# Patient Record
Sex: MALE | Race: WHITE | NOT HISPANIC OR LATINO | Employment: FULL TIME | URBAN - METROPOLITAN AREA
[De-identification: names, ages, dates, MRNs, and addresses within clinical notes are randomized per-mention and may not be internally consistent; named-entity substitution may affect disease eponyms.]

---

## 2017-03-14 ENCOUNTER — OFFICE VISIT (OUTPATIENT)
Dept: AUDIOLOGY | Facility: CLINIC | Age: 57
End: 2017-03-14

## 2017-03-14 DIAGNOSIS — H90.72 MIXED HEARING LOSS OF LEFT EAR: ICD-10-CM

## 2017-03-14 DIAGNOSIS — H91.90 HEARING LOSS: Primary | ICD-10-CM

## 2017-03-14 DIAGNOSIS — H90.5 SENSORINEURAL HEARING LOSS OF RIGHT EAR: ICD-10-CM

## 2017-03-14 NOTE — MR AVS SNAPSHOT
After Visit Summary   3/14/2017    Pedro Pablo Hill    MRN: [id number]           Patient Information     Date Of Birth          1960        Visit Information        Provider Department      3/14/2017 4:00 PM Janett Gorman [facility]        Today's Diagnoses     Sensorineural hearing loss of right ear        Mixed hearing loss of left ear           Follow-ups after your visit        Your next 10 appointments already scheduled     Mar 15, 2017  7:00 AM CDT   (Arrive by 6:45 AM)   Return Visit with Desi Watkins MD   [facility] ([facility] [place])    [address]  [address] [facility] [address] [phone]              Who to contact     Please call your clinic at [phone] to:    Ask questions about your health    Make or cancel appointments    Discuss your medicines    Learn about your test results    Speak to your doctor   If you have compliments or concerns about an experience at your clinic, or if you wish to file a complaint, please contact [facility] Patient Relations at [phone] or email us at [email or web address]         Additional Information About Your Visit        MyChart Information     Boltt gives you secure access to your electronic health record. If you see a primary care provider, you can also send messages to your care team and make appointments. If you have questions, please call your primary care clinic.  If you do not have a primary care provider, please call [phone] and they will assist you.      Mobile Roadie is an electronic gateway that provides easy, online access to your medical records. With Mobile Roadie, you can request a clinic appointment, read your test results, renew a prescription or communicate with your care team.     To access your existing account, please contact your [facility] or call [phone] for assistance.         Care EveryWhere ID     This is your Care EveryWhere ID. This could be used by other organizations to access your [place] medical records  [id number]         Blood Pressure from Last 3 Encounters:   11/19/12 137/96   10/10/12 120/78   07/24/12 118/70    Weight from Last 3 Encounters:   11/26/12 81.6 kg (180 lb)   11/19/12 81.6 kg (180 lb)   10/10/12 82.6 kg (182 lb)              We Performed the Following     AUDIOGRAM/TYMPANOGRAM - INTERFACE     AUDIOLOGY ADULT REFERRAL     Cmpn Audiometry Thrshld Eval & Speech Recog (93534)     Reduced 52 - Tymps / Reflex   (27940)        Primary Care Provider Office Phone # Fax #    Masha Main PA-C [phone]       [facility] [address] [place] [address]        Thank you!     Thank you for choosing [facility] AUDIOLOGY  for your care. Our goal is always to provide you with excellent care. Hearing back from our patients is one way we can continue to improve our services. Please take a few minutes to complete the written survey that you may receive in the mail after your visit with us. Thank you!             Your Updated Medication List - Protect others around you: Learn how to safely use, store and throw away your medicines at www.disposemymeds.org.          This list is accurate as of: 3/14/17  5:18 PM.  Always use your most recent med list.                   Brand Name Dispense Instructions for use    aspirin 325 MG tablet      Take 325 mg by mouth daily.       B-D INSULIN SYRINGE U/F SHORT      use as directed       * B-D U/F 31G X 8 MM   Generic drug:  insulin pen needle      use 7 per day       * insulin pen needle 31G X 8 MM     630 each    Use 7 daiily       cholecalciferol 5000 UNITS Caps      Take 1 capsule by mouth 2 times daily.       fish oil-omega-3 fatty acids 1000 MG capsule      Take 2 capsules by mouth 2 times daily.       insulin glargine 100 UNIT/ML injection    LANTUS SOLOSTAR    60 mL    Inject  Subcutaneous. 56  units SQ at bedtime.       liraglutide 18 MG/3ML Soln    VICTOZA    9 mL    Inject   1.2 mg SQ once a day.       lisinopril 5 MG tablet    PRINIVIL/ZESTRIL    30 tablet    Take 1 tablet by mouth daily.       metFORMIN 500 MG 24 hr tablet    GLUCOPHAGE-XR    120 tablet    Take 2 tablets by mouth twice daily.       NovoLOG FLEXpen 100 UNITS/ML injection   Generic drug:  insulin aspart     3 Month    Inject  Subcutaneous. 2 units/15 grams of carbohydrate with meals and snacks, plus correction scale 1 unit/25 mg/dl glucose over 125.       omeprazole 20 MG CR capsule    priLOSEC    60 capsule    Take 1 capsule by mouth 2 times daily.       pregabalin 50 MG capsule    LYRICA    90 capsule    Take 1 tablet three times daily  For pain       * Notice:  This list has 2 medication(s) that are the same as other medications prescribed for you. Read the directions carefully, and ask your doctor or other care provider to review them with you.

## 2017-03-14 NOTE — PROGRESS NOTES
AUDIOLOGY REPORT    SUMMARY: Audiology visit completed. See audiogram for results.      RECOMMENDATIONS: Follow-up with ENT.    Brendan Goncalves.  Licensed Audiologist  [place] #[id number]

## 2017-03-15 ENCOUNTER — OFFICE VISIT (OUTPATIENT)
Dept: OTOLARYNGOLOGY | Facility: CLINIC | Age: 57
End: 2017-03-15

## 2017-03-15 VITALS — WEIGHT: 186 LBS | BODY MASS INDEX: 29.19 KG/M2 | HEIGHT: 67 IN

## 2017-03-15 DIAGNOSIS — H90.3 ASYMMETRICAL SENSORINEURAL HEARING LOSS: Primary | ICD-10-CM

## 2017-03-15 ASSESSMENT — PAIN SCALES - GENERAL: PAINLEVEL: NO PAIN (0)

## 2017-03-15 NOTE — LETTER
3/15/2017      RE: Pedro Pablo Hill  [address] [address]       Pedro Pablo Hill is seen for concerns of right hearing loss.  He had a left sudden sensorineural hearing loss several years ago which was treated with oral and intratympanic steroids.  He continues to have a loss for which he wears hearing aids and feels they work well.  He began having right sided fullness and crackling and hearing loss recently.  He was seen yesterday for an audiogram and was noted to have a cerumen impaction on the right which was cleaned.  He says his ear feels much better now.    Review of systems:  No cough but he has had intermittent nasal congestion for several months.    Physical examination:  male in no acute distress.  Alert and answering questions appropriately.  HB 1/6 bilaterally.  Both ears examined under the microscope.  Right ear canal clear, TM intact with a well aerated middle ear.  Left era canal clear, TM intact with a well aerated middle ear, TM thin in the posterior inferior quadrant likely from his steroid injections.    Audiogram:  Right normal downsloping to a mild sensorineural hearing loss in the highest frequencies, 96% speech discrimination.  Left normal/mild downsloping to moderate upsloping to normal/mild downsloping again to moderate primarily sensorineural hearing loss with 92% speech discrimination.  Hearing stable bilaterally.  Normal tympanograms.  Right ipsilateral reflex present only.    Assessment and plan:  Stable hearing bilaterally.  His symptoms likely were secondary to the cerumen impaction.      Desi Watkins MD

## 2017-03-15 NOTE — PROGRESS NOTES
Pedro Pablo Hill is seen for concerns of right hearing loss.  He had a left sudden sensorineural hearing loss several years ago which was treated with oral and intratympanic steroids.  He continues to have a loss for which he wears hearing aids and feels they work well.  He began having right sided fullness and crackling and hearing loss recently.  He was seen yesterday for an audiogram and was noted to have a cerumen impaction on the right which was cleaned.  He says his ear feels much better now.    Review of systems:  No cough but he has had intermittent nasal congestion for several months.    Physical examination:  male in no acute distress.  Alert and answering questions appropriately.  HB 1/6 bilaterally.  Both ears examined under the microscope.  Right ear canal clear, TM intact with a well aerated middle ear.  Left era canal clear, TM intact with a well aerated middle ear, TM thin in the posterior inferior quadrant likely from his steroid injections.    Audiogram:  Right normal downsloping to a mild sensorineural hearing loss in the highest frequencies, 96% speech discrimination.  Left normal/mild downsloping to moderate upsloping to normal/mild downsloping again to moderate primarily sensorineural hearing loss with 92% speech discrimination.  Hearing stable bilaterally.  Normal tympanograms.  Right ipsilateral reflex present only.    Assessment and plan:  Stable hearing bilaterally.  His symptoms likely were secondary to the cerumen impaction.

## 2017-03-15 NOTE — MR AVS SNAPSHOT
After Visit Summary   3/15/2017    Pedro Pablo Hill    MRN: [id number]           Patient Information     Date Of Birth          1960        Visit Information        Provider Department      3/15/2017 7:00 AM Desi Watkins MD [facility]        Today's Diagnoses     Asymmetrical sensorineural hearing loss    -  1      Care Instructions       Please call our clinic for any questions,concerns,or worsening symptoms.      Clinic #[phone]       Option 3  for the triage nurse.        Follow-ups after your visit        Follow-up notes from your care team     Return if symptoms worsen or fail to improve.      Who to contact     Please call your clinic at [phone] to:    Ask questions about your health    Make or cancel appointments    Discuss your medicines    Learn about your test results    Speak to your doctor   If you have compliments or concerns about an experience at your clinic, or if you wish to file a complaint, please contact [facility] Patient Relations at [phone] or email us at [email or web address]         Additional Information About Your Visit        MyChart Information     GoRest Software gives you secure access to your electronic health record. If you see a primary care provider, you can also send messages to your care team and make appointments. If you have questions, please call your primary care clinic.  If you do not have a primary care provider, please call [phone] and they will assist you.      GoRest Software is an electronic gateway that provides easy, online access to your medical records. With [employer] Software, you can request a clinic appointment, read your test results, renew a prescription or communicate with your care team.     To access your existing account, please contact your [facility] or call [phone] for assistance.        Care EveryWhere ID     This is your Care EveryWhere ID. This  "could be used by other organizations to access your [place] medical records  [id number]        Your Vitals Were     Height BMI (Body Mass Index)                1.702 m (5' 7\") 29.13 kg/m2           Blood Pressure from Last 3 Encounters:   11/19/12 137/96   10/10/12 120/78   07/24/12 118/70    Weight from Last 3 Encounters:   03/15/17 84.4 kg (186 lb)   11/26/12 81.6 kg (180 lb)   11/19/12 81.6 kg (180 lb)              We Performed the Following     BINOCULAR MICROSCOPY        Primary Care Provider Office Phone # Fax #    Masha Main PA-C [phone]       [facility] [address] [place] [address]        Thank you!     Thank you for choosing [facility]  for your care. Our goal is always to provide you with excellent care. Hearing back from our patients is one way we can continue to improve our services. Please take a few minutes to complete the written survey that you may receive in the mail after your visit with us. Thank you!             Your Updated Medication List - Protect others around you: Learn how to safely use, store and throw away your medicines at www.disposemymeds.org.          This list is accurate as of: 3/15/17  7:48 AM.  Always use your most recent med list.                   Brand Name Dispense Instructions for use    aspirin 325 MG tablet      Take 325 mg by mouth daily Reported on 3/15/2017       B-D INSULIN SYRINGE U/F SHORT      use as directed       * B-D U/F 31G X 8 MM   Generic drug:  insulin pen needle      use 7 per day       * insulin pen needle 31G X 8 MM     630 each    Use 7 daiily       cholecalciferol 5000 UNITS Caps      Take 1 capsule by mouth 2 times daily Reported on 3/15/2017       fish oil-omega-3 fatty acids 1000 MG capsule      Take 2 capsules by mouth 2 times daily.       insulin glargine 100 UNIT/ML injection    LANTUS SOLOSTAR    60 mL    Inject  Subcutaneous. 56 units SQ at bedtime.       liraglutide 18 MG/3ML " Soln    VICTOZA    9 mL    Inject   1.2 mg SQ once a day.       lisinopril 5 MG tablet    PRINIVIL/ZESTRIL    30 tablet    Take 1 tablet by mouth daily.       metFORMIN 500 MG 24 hr tablet    GLUCOPHAGE-XR    120 tablet    Take 2 tablets by mouth twice daily.       NovoLOG FLEXpen 100 UNITS/ML injection   Generic drug:  insulin aspart     3 Month    Inject  Subcutaneous. 2 units/15 grams of carbohydrate with meals and snacks, plus correction scale 1 unit/25 mg/dl glucose over 125.       omeprazole 20 MG CR capsule    priLOSEC    60 capsule    Take 1 capsule by mouth 2 times daily.       pregabalin 50 MG capsule    LYRICA    90 capsule    Take 1 tablet three times daily  For pain       * Notice:  This list has 2 medication(s) that are the same as other medications prescribed for you. Read the directions carefully, and ask your doctor or other care provider to review them with you.

## 2017-03-15 NOTE — PATIENT INSTRUCTIONS
Please call our clinic for any questions,concerns,or worsening symptoms.      Clinic #[phone]       Option 3  for the triage nurse.

## 2017-03-15 NOTE — NURSING NOTE
Chief Complaint   Patient presents with     RECHECK     2 year f/u with audio     Gisella Gar Medical Assistant

## 2017-12-26 ENCOUNTER — OFFICE VISIT (OUTPATIENT)
Dept: AUDIOLOGY | Facility: CLINIC | Age: 57
End: 2017-12-26
Payer: COMMERCIAL

## 2017-12-26 DIAGNOSIS — H90.42 SENSORINEURAL HEARING LOSS (SNHL) OF LEFT EAR WITH UNRESTRICTED HEARING OF RIGHT EAR: Primary | ICD-10-CM

## 2017-12-26 NOTE — MR AVS SNAPSHOT
After Visit Summary   12/26/2017    Pedro Pablo Hill    MRN: [id number]           Patient Information     Date Of Birth          1960        Visit Information        Provider Department      12/26/2017 8:30 AM Anu Ledesma [facility]D [facility] Audiology        Today's Diagnoses     Sensorineural hearing loss (SNHL) of left ear with unrestricted hearing of right ear    -  1       Follow-ups after your visit        Who to contact     Please call your clinic at [phone] to:    Ask questions about your health    Make or cancel appointments    Discuss your medicines    Learn about your test results    Speak to your doctor   If you have compliments or concerns about an experience at your clinic, or if you wish to file a complaint, please contact [facility] Patient Relations at [phone] or email us at [email or web address]         Additional Information About Your Visit        MyChart Information     Drawn to [employer] gives you secure access to your electronic health record. If you see a primary care provider, you can also send messages to your care team and make appointments. If you have questions, please call your primary care clinic.  If you do not have a primary care provider, please call [phone] and they will assist you.      Oxtex is an electronic gateway that provides easy, online access to your medical records. With Oxtex, you can request a clinic appointment, read your test results, renew a prescription or communicate with your care team.     To access your existing account, please contact your [facility] or call [phone] for assistance.        Care EveryWhere ID     This is your Care EveryWhere ID. This could be used by other organizations to access your [place] medical records  [id number]         Blood Pressure from Last 3 Encounters:   11/19/12 137/96   10/10/12 120/78   07/24/12 118/70    Weight from Last 3  Encounters:   03/15/17 84.4 kg (186 lb)   11/26/12 81.6 kg (180 lb)   11/19/12 81.6 kg (180 lb)              We Performed the Following     No Charge, Hearing Aid Clinic Visit        Primary Care Provider Office Phone # Fax #    Masha Main PA-C [phone]       [facility] [address] [place] [address]        Equal Access to Services     CARLOS ALBERTO SERRANO : Hadii aad ku hadasho Soomaali, waaxda luqadaha, qaybta kaalmada adeegyada, waxay idiin hayaan adeeg kharash la'aan ah. So [facility] [phone].    ATENCIÓN: Si habla español, tiene a pastor disposición servicios gratuitos de asistencia lingüística. [facility] [phone].    We comply with applicable federal civil rights laws and [place] laws. We do not discriminate on the basis of race, color, national origin, age, disability, sex, sexual orientation, or gender identity.            Thank you!     Thank you for choosing [facility] AUDIOLOGY  for your care. Our goal is always to provide you with excellent care. Hearing back from our patients is one way we can continue to improve our services. Please take a few minutes to complete the written survey that you may receive in the mail after your visit with us. Thank you!             Your Updated Medication List - Protect others around you: Learn how to safely use, store and throw away your medicines at www.disposemymeds.org.          This list is accurate as of: 12/26/17 10:29 AM.  Always use your most recent med list.                   Brand Name Dispense Instructions for use Diagnosis    aspirin 325 MG tablet      Take 325 mg by mouth daily Reported on 3/15/2017    Diabetes mellitus, type 2 (H)       B-D INSULIN SYRINGE U/F SHORT      use as directed        * B-D U/F 31G X 8 MM   Generic drug:  insulin pen needle      use 7 per day        * insulin pen needle 31G X 8 MM     630 each    Use 7 daiily    Diabetes mellitus (H)       cholecalciferol 5000 UNITS Caps      Take 1 capsule by  mouth 2 times daily Reported on 3/15/2017        fish oil-omega-3 fatty acids 1000 MG capsule      Take 2 capsules by mouth 2 times daily.    Diabetes mellitus, type 2 (H)       insulin glargine 100 UNIT/ML injection    LANTUS SOLOSTAR    60 mL    Inject  Subcutaneous. 56 units SQ at bedtime.    Diabetes mellitus (H)       liraglutide 18 MG/3ML Soln    VICTOZA    9 mL    Inject   1.2 mg SQ once a day.    Diabetes mellitus, type 2 (H)       lisinopril 5 MG tablet    PRINIVIL/ZESTRIL    30 tablet    Take 1 tablet by mouth daily.    Diabetes mellitus, type 2 (H)       metFORMIN 500 MG 24 hr tablet    GLUCOPHAGE-XR    120 tablet    Take 2 tablets by mouth twice daily.    Diabetes mellitus (H)       NovoLOG FLEXpen 100 UNITS/ML injection   Generic drug:  insulin aspart     3 Month    Inject  Subcutaneous. 2 units/15 grams of carbohydrate with meals and snacks, plus correction scale 1 unit/25 mg/dl glucose over 125.    Diabetes mellitus, type 2 (H)       omeprazole 20 MG CR capsule    priLOSEC    60 capsule    Take 1 capsule by mouth 2 times daily.    Diabetes mellitus (H)       pregabalin 50 MG capsule    LYRICA    90 capsule    Take 1 tablet three times daily  For pain    Type II or unspecified type diabetes mellitus with renal manifestations, not stated as uncontrolled(250.40) (H)       * Notice:  This list has 2 medication(s) that are the same as other medications prescribed for you. Read the directions carefully, and ask your doctor or other care provider to review them with you.

## 2017-12-26 NOTE — PROGRESS NOTES
AUDIOLOGY REPORT    BACKGROUND INFORMATION: Pedro Pablo Hill was scheduled in Audiology at the [facility] on 12/26/2017 for a hearing evaluation and hearing aid adjustments.  However, when the patient arrived he did confirm that the hearing aids were working very well prior to sending for repair and now the settings are incorrect with the buttons not changing volume/programs. The patient confirms that his hearing has not changed since last testing completed in March of 2017 (documented normal hearing through 4000 Hz sloping to mild sensorineural hearing 1940-4583 Hz for the right ear and borderline normal to moderately severe primarily sensorineural hearing loss for the left ear) and so he would like to cancel the hearing evaluation today and have the hearing aids re-programmed to their previous settings from prior to repair.    TEST RESULTS AND PROCEDURES: A listening check confirmed that the buttons are not functioning to change volume and programs between the two devices. The hearing aids were connected to the computer and it was attempted to load settings from the last programming 5/18/17 but the software is not allowing this. After trouble-shooting, it was noted that the  on the left device is not correct after repair. The  was changed back to the original size/strength (1M) and the settings from 5/18/17 are now successfully loaded in the hearing aids.     The patient reports that he feels to occluded with the current non-custom hearing aid dome for the left ear. The patient was counseled that the fit of the left should be more occluded than the right due to the presence of more severe hearing loss on the left side. An 8 mm closed dome was tried on the left aid and the patient prefers the fit with no feedback present in office and the patient reporting good volume, sound quality and symmetry between ears.     SUMMARY AND RECOMMENDATIONS: A  hearing aid check was performed today.  Adjustments were made as noted above and the patient will return as needed or at least every 4-6 months for cleaning and assessment of hearing aid.  Please call this clinic with questions regarding today s visit.    Brendan Carranza.  Licensed Audiologist  [place] #[id number]

## 2018-11-05 ENCOUNTER — OFFICE VISIT (OUTPATIENT)
Dept: AUDIOLOGY | Facility: CLINIC | Age: 58
End: 2018-11-05
Payer: COMMERCIAL

## 2018-11-05 DIAGNOSIS — H90.A21 SENSORINEURAL HEARING LOSS (SNHL) OF RIGHT EAR WITH RESTRICTED HEARING OF LEFT EAR: ICD-10-CM

## 2018-11-05 DIAGNOSIS — H93.13 TINNITUS OF BOTH EARS: ICD-10-CM

## 2018-11-05 DIAGNOSIS — H90.A32 MIXED CONDUCTIVE AND SENSORINEURAL HEARING LOSS OF LEFT EAR WITH RESTRICTED HEARING OF RIGHT EAR: Primary | ICD-10-CM

## 2018-11-05 NOTE — MR AVS SNAPSHOT
After Visit Summary   11/5/2018    Pedro Pablo Hill    MRN: [id number]           Patient Information     Date Of Birth          1960        Visit Information        Provider Department      11/5/2018 8:00 AM Radha Zurita AuD M [facility] Audiology        Today's Diagnoses     Mixed conductive and sensorineural hearing loss of left ear with restricted hearing of right ear    -  1    Sensorineural hearing loss (SNHL) of right ear with restricted hearing of left ear        Tinnitus of both ears           Follow-ups after your visit        Your next 10 appointments already scheduled     Nov 26, 2018  1:00 PM CST   Hearing Aid Fitting with Sada Villar [facility] Audiology ([facility])    [address]  [address] [facility] [place] [address] [phone]              Who to contact     Please call your clinic at [phone] to:    Ask questions about your health    Make or cancel appointments    Discuss your medicines    Learn about your test results    Speak to your doctor            Additional Information About Your Visit        MyChart Information     [employer] gives you secure access to your electronic health record. If you see a primary care provider, you can also send messages to your care team and make appointments. If you have questions, please call your primary care clinic.  If you do not have a primary care provider, please call [phone] and they will assist you.      [employer] is an electronic gateway that provides easy, online access to your medical records. With [employer], you can request a clinic appointment, read your test results, renew a prescription or communicate with your care team.     To access your existing account, please contact your [facility] or call [phone] for assistance.        Care EveryWhere ID     This is your Care EveryWhere ID. This could be used by other organizations to access  your [place] medical records  [id number]         Blood Pressure from Last 3 Encounters:   11/19/12 137/96   10/10/12 120/78   07/24/12 118/70    Weight from Last 3 Encounters:   03/15/17 84.4 kg (186 lb)   11/26/12 81.6 kg (180 lb)   11/19/12 81.6 kg (180 lb)              We Performed the Following     AUDIOGRAM/TYMPANOGRAM - INTERFACE     [facility] Audiometry Thrshld Eval & Speech Recog (34091)     Hearing Aid Exam, Binaural (43879)     Tympanometry (impedance - testing) (03165)        Primary Care Provider Office Phone # Fax #    Masha Main PA-C [phone]       [facility] [address] [place] [address]        Equal Access to Services     [facility] : Hadii aad ku hadasho Soomaali, waaxda luqadaha, qaybta kaalmada adeegyada, waxay idiin hayaan william kharavictor manuel ortiz . So [facility] [phone].    ATENCIÓN: Si habla español, tiene a pastor disposición servicios gratuitos de asistencia lingüística. Llame al [phone].    We comply with applicable federal civil rights laws and [place] laws. We do not discriminate on the basis of race, color, national origin, age, disability, sex, sexual orientation, or gender identity.            Thank you!     Thank you for choosing [facility] AUDIOLOGY  for your care. Our goal is always to provide you with excellent care. Hearing back from our patients is one way we can continue to improve our services. Please take a few minutes to complete the written survey that you may receive in the mail after your visit with us. Thank you!             Your Updated Medication List - Protect others around you: Learn how to safely use, store and throw away your medicines at www.disposemymeds.org.          This list is accurate as of 11/5/18  9:54 AM.  Always use your most recent med list.                   Brand Name Dispense Instructions for use Diagnosis    aspirin 325 MG tablet      Take 325 mg by mouth daily Reported on 3/15/2017    Diabetes mellitus,  type 2 (H)       B-D INSULIN SYRINGE U/F SHORT      use as directed        * B-D U/F 31G X 8 MM   Generic drug:  insulin pen needle      use 7 per day        * insulin pen needle 31G X 8 MM     630 each    Use 7 daiily    Diabetes mellitus (H)       cholecalciferol 5000 units Caps      Take 1 capsule by mouth 2 times daily Reported on 3/15/2017        fish oil-omega-3 fatty acids 1000 MG capsule      Take 2 capsules by mouth 2 times daily.    Diabetes mellitus, type 2 (H)       insulin glargine 100 UNIT/ML injection    LANTUS SOLOSTAR    60 mL    Inject  Subcutaneous. 56 units SQ at bedtime.    Diabetes mellitus (H)       liraglutide 18 MG/3ML Soln    VICTOZA    9 mL    Inject   1.2 mg SQ once a day.    Diabetes mellitus, type 2 (H)       lisinopril 5 MG tablet    PRINIVIL/ZESTRIL    30 tablet    Take 1 tablet by mouth daily.    Diabetes mellitus, type 2 (H)       metFORMIN 500 MG 24 hr tablet    GLUCOPHAGE-XR    120 tablet    Take 2 tablets by mouth twice daily.    Diabetes mellitus (H)       NovoLOG FLEXpen 100 UNITS/ML injection   Generic drug:  insulin aspart     3 Month    Inject  Subcutaneous. 2 units/15 grams of carbohydrate with meals and snacks, plus correction scale 1 unit/25 mg/dl glucose over 125.    Diabetes mellitus, type 2 (H)       omeprazole 20 MG CR capsule    priLOSEC    60 capsule    Take 1 capsule by mouth 2 times daily.    Diabetes mellitus (H)       pregabalin 50 MG capsule    LYRICA    90 capsule    Take 1 tablet three times daily  For pain    Type II or unspecified type diabetes mellitus with renal manifestations, not stated as uncontrolled(250.40) (H)       * Notice:  This list has 2 medication(s) that are the same as other medications prescribed for you. Read the directions carefully, and ask your doctor or other care provider to review them with you.

## 2018-11-05 NOTE — PROGRESS NOTES
AUDIOLOGY REPORT    SUBJECTIVE:  Pedro Pablo Hill is a 58 year old male who was seen in the Audiology Clinic at the [facility], [facility] and [facility] for a hearing aid evaluation, referred by Desi Watkins MD.  The patient has been seen previously in this clinic on 3/14/17 for assessment and results indicated a bilateral asymmetric sensorineural hearing loss.  He was fit with binaural Siemens Pure bx 7 hearing aids in 2015 and is interested in updated technology. The patient reports that hearing has been stable bilaterally.  He reports bilateral tinnitus which he only notices when he is not wearing his hearing aids. The patient denies bilateral otalgia, bilateral drainage, bilateral aural fullness, and dizziness.  The patient notes difficulty with communication in a variety of listening situations, he reports he has most difficulty when listening at a distance or across rooms.  He also notes difficulty when listening in large rooms such as in a [employer] or big [employer] store.    OBJECTIVE:  Fall Risk Screen:  1. Have you fallen two or more times in the past year? No  2. Have you fallen and had an injury in the past year? No    Timed Up and Go Score (in seconds): not tested  Is patient a fall risk? No  Referral initiated: No  Fall Risk Assessment Completed by Audiology    Otoscopic exam indicates non-occluding cerumen bilaterally.  Some of the cerumen was removed by the provider today but due to the depth of the cerumen and because the patient reports he is on blood thinning medication, not all could be removed.    Pure Tone Thresholds assessed using conventional audiometry with good reliability from 250-8000 Hz bilaterally using insert earphones and circumaural headphones     RIGHT:  Normal to moderate likely sensorineural hearing loss .    LEFT:    Normal to moderately-severe mixed hearing loss    Tympanogram:    RIGHT: normal eardrum mobility    LEFT:   hypermobile    Reflexes (reported by  stimulus ear):  Could not obtain reliable tracings    Speech Reception Threshold:    RIGHT: 20 dB HL    LEFT:   40 dB HL    Word Recognition Score:     RIGHT: 96% at 60 dB HL using NU-6 recorded word list.    LEFT:   96% at 80 dB HL using NU-6 recorded word list.    Patient is a hearing aid candidate. Patient would like to move forward with a hearing aid evaluation today. Therefore, the patient was presented with different options for amplification to help aid in communication. Discussed styles, levels of technology and monaural vs. binaural fitting.     It was discussed at length that he is not an ideal candidate for a hearing aid in the right ear given his normal hearing thresholds through 4 kHz.  He expressed that he enjoyed having the binaural features of the hearing aid and would like to continue with two hearing devices.  He has an Android phone and likes the features of his Tapactive ryland as well as having rechargeable devices. He expressed that he did not use the Flat.to and so was not interested in a streaming accessory.  He expressed that he would like to stay with the Signia .  It was also discussed that his current hearing aids are not 4 years old yet and so he does not need to replace them if he would like to continue to work with his current set. He stated that he would like new technology.    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  He expressed that he is in small meetings and does not have too much difficulty hearing in those situations. He notes he does use his right ear for the phone and that seems to work well.    The hearing aids mutually chosen were:  Binaural: Signia Pure Nx7 Charge and Go  COLOR: 9  BATTERY SIZE: Rechargeable  EARMOLD/TIPS: 8mm open right, 8mm closed left  CANAL/ LENGTH: 1 - s right, m left    It was reviewed that with this newer technology,  the rechargeable device is completely enclosed meaning he would not have the opportunity to change it for a disposable.  He expressed understanding and wanted to proceed with the rechargeable.      ASSESSMENT:   Compared to patient's previous audiogram dated 3/14/17, hearing has decreased 15 dB at 4 kHz in the left ear, all other thresholds are within 10 dB of the previous hearing evaluation. Today s results were discussed with the patient in detail.     Reviewed purchase information and warranty information with patient. The 45 day trial period was explained to patient. The patient was given a copy of the [place] Department of Health consumer brochure on purchasing hearing instruments. Patient risk factors have been provided to the patient in writing prior to the sale of the hearing aid per FDA regulation. The risk factors are also available in the User Instructional Booklet to be presented on the day of the hearing aid fitting. Hearing aid(s) ordered. Hearing aid evaluation completed.    PLAN:  Patient was counseled regarding hearing loss and impact on communication.  Patient is a good candidate for amplification in the left ear and very borderline candidate in the right ear at this time. Handout on good communication strategies, and hearing aid use was given to patient. It is recommended that the patient have his ears cleaned of cerumen prior to the next appointment on 11/26/18.  It is recommended that he continue to monitor his hearing status every 1-2 years, sooner if concerns arise.  Pedro Pablo is scheduled to return in 2-3 weeks for a hearing aid fitting and programming. Purchase agreement will be completed on that date. Please contact this clinic with any questions or concerns.        Radha Zurita, Sada  Audiologist  [place] License  #[id number]

## 2018-11-25 ENCOUNTER — TRANSFERRED RECORDS (OUTPATIENT)
Dept: HEALTH INFORMATION MANAGEMENT | Facility: CLINIC | Age: 58
End: 2018-11-25

## 2018-11-26 ENCOUNTER — OFFICE VISIT (OUTPATIENT)
Dept: AUDIOLOGY | Facility: CLINIC | Age: 58
End: 2018-11-26
Payer: COMMERCIAL

## 2018-11-26 DIAGNOSIS — H90.3 SENSORINEURAL HEARING LOSS, ASYMMETRICAL: Primary | ICD-10-CM

## 2018-11-26 DIAGNOSIS — H90.A32 MIXED CONDUCTIVE AND SENSORINEURAL HEARING LOSS OF LEFT EAR WITH RESTRICTED HEARING OF RIGHT EAR: ICD-10-CM

## 2018-11-26 DIAGNOSIS — H90.A21 SENSORINEURAL HEARING LOSS (SNHL) OF RIGHT EAR WITH RESTRICTED HEARING OF LEFT EAR: ICD-10-CM

## 2018-11-26 NOTE — MR AVS SNAPSHOT
After Visit Summary   11/26/2018    Pedro Pablo Hill    MRN: [id number]           Patient Information     Date Of Birth          1960        Visit Information        Provider Department      11/26/2018 1:00 PM Radha Zurita AuD M [facility] Audiology         Follow-ups after your visit        Your next 10 appointments already scheduled     Dec 05, 2018 10:30 AM CST   (Arrive by 10:15 AM)   Return Visit with MD CLAUDINE Aly [facility] Ear Nose and Throat ([facility])    [address] [place] [address] [phone]              Who to contact     Please call your clinic at [phone] to:    Ask questions about your health    Make or cancel appointments    Discuss your medicines    Learn about your test results    Speak to your doctor            Additional Information About Your Visit        MyChart Information     [employer] gives you secure access to your electronic health record. If you see a primary care provider, you can also send messages to your care team and make appointments. If you have questions, please call your primary care clinic.  If you do not have a primary care provider, please call [phone] and they will assist you.      [employer] is an electronic gateway that provides easy, online access to your medical records. With [employer], you can request a clinic appointment, read your test results, renew a prescription or communicate with your care team.     To access your existing account, please contact your [facility] or call [phone] for assistance.        Care EveryWhere ID     This is your Care EveryWhere ID. This could be used by other organizations to access your [place] medical records  [id number]         Blood Pressure from Last 3 Encounters:   11/19/12 137/96   10/10/12 120/78   07/24/12 118/70    Weight from Last 3 Encounters:   03/15/17 84.4 kg (186 lb)   11/26/12 81.6 kg (180  lb)   11/19/12 81.6 kg (180 lb)              Today, you had the following     No orders found for display       Primary Care Provider Office Phone # Fax #    Masha Main PA-C [phone]       [facility] [address] [facility] [address]        Equal Access to Services     [facility]: Hadii aad ku hadasho Soomaali, waaxda luqadaha, qaybta kaalmada adeegyada, waxay idiin hayaan adeeg kharash la'aan . So [facility] [phone].    ATENCIÓN: Si habla español, tiene a pastor disposición servicios gratuitos de asistencia lingüística. Llame al [phone].    We comply with applicable federal civil rights laws and [place] laws. We do not discriminate on the basis of race, color, national origin, age, disability, sex, sexual orientation, or gender identity.            Thank you!     Thank you for choosing [facility] AUDIOLOGY  for your care. Our goal is always to provide you with excellent care. Hearing back from our patients is one way we can continue to improve our services. Please take a few minutes to complete the written survey that you may receive in the mail after your visit with us. Thank you!             Your Updated Medication List - Protect others around you: Learn how to safely use, store and throw away your medicines at www.disposemymeds.org.          This list is accurate as of 11/26/18  2:19 PM.  Always use your most recent med list.                   Brand Name Dispense Instructions for use Diagnosis    aspirin 325 MG tablet    ASA     Take 325 mg by mouth daily Reported on 3/15/2017    Diabetes mellitus, type 2 (H)       B-D INSULIN SYRINGE U/F SHORT      use as directed        * B-D U/F 31G X 8 MM miscellaneous   Generic drug:  insulin pen needle      use 7 per day        * insulin pen needle 31G X 8 MM miscellaneous    31G X 8 MM    630 each    Use 7 daiily    Diabetes mellitus (H)       cholecalciferol 5000 units Caps      Take 1 capsule by mouth 2 times daily Reported  on 3/15/2017        fish oil-omega-3 fatty acids 1000 MG capsule      Take 2 capsules by mouth 2 times daily.    Diabetes mellitus, type 2 (H)       insulin glargine 100 UNIT/ML vial    LANTUS SOLOSTAR    60 mL    Inject  Subcutaneous. 56 units SQ at bedtime.    Diabetes mellitus (H)       liraglutide 18 MG/3ML Soln    VICTOZA    9 mL    Inject   1.2 mg SQ once a day.    Diabetes mellitus, type 2 (H)       lisinopril 5 MG tablet    PRINIVIL/ZESTRIL    30 tablet    Take 1 tablet by mouth daily.    Diabetes mellitus, type 2 (H)       metFORMIN 500 MG 24 hr tablet    GLUCOPHAGE-XR    120 tablet    Take 2 tablets by mouth twice daily.    Diabetes mellitus (H)       NovoLOG FLEXpen 100 UNITS/ML vial   Generic drug:  insulin aspart     3 Month    Inject  Subcutaneous. 2 units/15 grams of carbohydrate with meals and snacks, plus correction scale 1 unit/25 mg/dl glucose over 125.    Diabetes mellitus, type 2 (H)       omeprazole 20 MG DR capsule    priLOSEC    60 capsule    Take 1 capsule by mouth 2 times daily.    Diabetes mellitus (H)       pregabalin 50 MG capsule    LYRICA    90 capsule    Take 1 tablet three times daily  For pain    Type II or unspecified type diabetes mellitus with renal manifestations, not stated as uncontrolled(250.40) (H)       * Notice:  This list has 2 medication(s) that are the same as other medications prescribed for you. Read the directions carefully, and ask your doctor or other care provider to review them with you.

## 2018-11-26 NOTE — PROGRESS NOTES
AUDIOLOGY REPORT    SUBJECTIVE: Pedro Palbo Hill is a 58 year old male who was seen in the Audiology Clinic at the [facility] for a fitting of binaural Signia Charge and Go Nx7 RITE hearing aids. Previous results have revealed a bilateral asymmetric sensorineural hearing loss. He is an experienced hearing aid user.     OBJECTIVE: The hearing aid conformity evaluation was completed.The hearing aids were placed and they provided a good fit. Simulated real-ear-microphone measurements were completed on the Saygent system and were a good match to NAL-NL1 target with soft sounds audible, moderate sounds comfortable, and loud sounds below discomfort. UCLs are verified through maximum power output measures and demonstrate appropriate limiting of loud inputs. (Real-ear measurements could not be completed due to cerumen in the ear). Pedro Pablo was oriented to proper hearing aid use, care, cleaning (no water, dry brush), batteries (rechargeable, insertion/removal, toxicity, low-battery signal), aid insertion/removal, user booklet, warranty information, storage cases, and other hearing aid details. The patient confirmed understanding of hearing aid use and care, and showed proper insertion of hearing aid and batteries while in the office today.Pedro Pablo reported good volume and sound quality today.   Hearing aids were programmed as follows:  Program 1:Universal    EAR(S) FIT: Bilateral  HEARING AID MODEL NAME: Signia Pure Charge and Go 7Nx   HEARING AID STYLE: -in-the-ear behind-the-ear  EARMOLDS/TIP: 8mm open right and 8mm closed left  SERIAL NUMBERS: Right: [id number] Left:: [id number]  WARRANTY END DATE: 11/4/2021    ASSESSMENT: Binaural hearing aids were fit today. Verification measures were performed. Pedro Pablo signed the Hearing Aid Purchase Agreement and was given a copy, as well as details on his hearing aids. Patient was counseled that exact out of pocket amounts cannot be determined for  hearing aid claims being sent to insurance. Any insurance coverage information presented to the patient is an estimate only, and is not a guarantee of payment. Patient has been advised to check with their own insurance.    PLAN:Pedro Pablo will return for follow-up in 2-3 weeks for a hearing aid review appointment. Patient is also scheduled to get the remaining cerumen removed from his ear canals, real-ear measurements will be made at the IRP appointment.  Hearing aids will be billed to the patient's insurance, the  will be billed directly to the patient as it is considered an accessory and not covered by insurance. Please call this clinic with questions regarding today s appointment.    Sada Villar  Audiologist  [place] License  #[id number]

## 2018-11-29 ENCOUNTER — PATIENT OUTREACH (OUTPATIENT)
Dept: CARE COORDINATION | Facility: CLINIC | Age: 58
End: 2018-11-29

## 2018-12-05 ENCOUNTER — OFFICE VISIT (OUTPATIENT)
Dept: OTOLARYNGOLOGY | Facility: CLINIC | Age: 58
End: 2018-12-05
Payer: COMMERCIAL

## 2018-12-05 DIAGNOSIS — H61.23 BILATERAL IMPACTED CERUMEN: Primary | ICD-10-CM

## 2018-12-05 ASSESSMENT — PAIN SCALES - GENERAL: PAINLEVEL: NO PAIN (0)

## 2018-12-05 NOTE — PROGRESS NOTES
Pedro Pablo Hill is here for cerumen impaction.  He wears hearing aids bilaterally.  No changes in hearing, otalgia or otorrhea.  Some crackling on the left, the right crackling improved after he used OTC wax softener drops.    The left ear was examined under the microscope.  There was noted to be a cerumen impaction.  It was cleaned with suction.  The TM is otherwise clear.  The opposite ear was also examined under the microscope and noted to have a cerumen impaction.  It was cleaned with suction.  The TM is otherwise clear.  The patient noted improvement of symptoms.    Return as needed/scheduled for cleaning.

## 2018-12-05 NOTE — Clinical Note
2018       RE: Pedro Pablo Hill  [address] [address]     Dear Colleague,    Thank you for referring your patient, Pedro Pablo Hill, to the [facility] EAR NOSE AND THROAT at General acute hospital. Please see a copy of my visit note below.    Pedro Pablo Hill is here for cerumen impaction.  He wears hearing aids bilaterally.  No changes in hearing, otalgia or otorrhea.  Some crackling on the left, the right crackling improved after he used OTC wax softener drops.    The left ear was examined under the microscope.  There was noted to be a cerumen impaction.  It was cleaned with {Ear Cleanin}.  The TM is otherwise clear.  The opposite ear was also examined under the microscope and noted to have a cerumen impaction.  It was cleaned with {Ear Cleanin}.  The TM is otherwise clear.  The patient noted improvement of symptoms.    Return as needed/scheduled for cleaning.        Again, thank you for allowing me to participate in the care of your patient.      Sincerely,    Desi Watkins MD

## 2018-12-05 NOTE — LETTER
12/5/2018      RE: Pedro Pablo Hill  [address] [address]       Pedro Pablo Hill is here for cerumen impaction.  He wears hearing aids bilaterally.  No changes in hearing, otalgia or otorrhea.  Some crackling on the left, the right crackling improved after he used OTC wax softener drops.    The left ear was examined under the microscope.  There was noted to be a cerumen impaction.  It was cleaned with suction.  The TM is otherwise clear.  The opposite ear was also examined under the microscope and noted to have a cerumen impaction.  It was cleaned with suction.  The TM is otherwise clear.  The patient noted improvement of symptoms.    Return as needed/scheduled for cleaning.        Desi Watkins MD

## 2018-12-05 NOTE — PATIENT INSTRUCTIONS
1. You can follow up in one year with either Dr. Nissen or Dr. Moss for cerumen cleaning.        Please call our clinic for any questions,concerns,or worsening symptoms.      Clinic #[phone]       Option 3  for the ENT Care Team.       Option 1 for scheduling.    Di ALLEN RNCC  [phone]

## 2019-01-09 ENCOUNTER — OFFICE VISIT (OUTPATIENT)
Dept: AUDIOLOGY | Facility: CLINIC | Age: 59
End: 2019-01-09
Payer: COMMERCIAL

## 2019-01-09 DIAGNOSIS — H90.5 SENSORINEURAL HEARING LOSS: ICD-10-CM

## 2019-01-09 DIAGNOSIS — H90.A32 MIXED CONDUCTIVE AND SENSORINEURAL HEARING LOSS OF LEFT EAR WITH RESTRICTED HEARING OF RIGHT EAR: Primary | ICD-10-CM

## 2019-01-09 NOTE — PROGRESS NOTES
AUDIOLOGY REPORT    SUBJECTIVE:Pedro Pablo Hill is a 58 year old male who was seen in the Audiology Clinic at the [facility] on 1/9/2019  for a follow-up check regarding the fitting of new hearing aids. 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  The patient has been seen previously in this clinic and was fit with binaural [employer]ia Charge and Go Nx7 hearing aids on 11/26/18.  Pedro Pablo reports good sound quality with the hearing aids. Patient reports that he will be keeping the hearing aids and is very happy with the rechargeable option.    OBJECTIVE:   The International Outcome Inventory-Hearing Aids (IOI-HA) was administered today.The patient s responses to the 7 questions can be compared to normative data relative to how others are performing with their hearing aids, as well as focusing audiologic care and counseling.This patient s Quality of Life score (Question 7) was 5, which is above normative average.     Based on patient report, the following changes were made; real-ear measurements were made now that his ears were cleaned of cerumen. Hearing aids were programmed to NAL-NL1 target gain and he reported good sound quality and clarity.    Reviewed 45 day trial period, care, cleaning (no water, dry brush), batteries (rechargeable) insertion/removal, toxicity, low-battery signal), aid insertion/removal, volume adjustment (if applicable), user booklet, warranty information, storage cases, and other hearing aid details.        ASSESSMENT: A follow-up appointment for hearing aid fitting was completed today. IOI-HA administered today. Changes to hearing aid was completed as outlined above.     PLAN:Pedro Pablo will return for follow-up as needed, or at least every 6-9 months for cleaning and assessment of hearing aid.  Today's appointment is a no-charge visit as the hearing aids are under warranty. Please call  this clinic with any questions regarding today s appointment.    Sada Villar  Audiologist  [place] License  #[id number]

## 2019-11-05 ENCOUNTER — HEALTH MAINTENANCE LETTER (OUTPATIENT)
Age: 59
End: 2019-11-05

## 2020-11-22 ENCOUNTER — HEALTH MAINTENANCE LETTER (OUTPATIENT)
Age: 60
End: 2020-11-22

## 2021-02-25 ENCOUNTER — TELEPHONE (OUTPATIENT)
Dept: AUDIOLOGY | Facility: CLINIC | Age: 61
End: 2021-02-25

## 2021-02-25 NOTE — TELEPHONE ENCOUNTER
LVM with options for patient and broken hearing aid:      1. Schedule [facility] AUDIOLOGY HCP with (any) Audiologist   2. Send in device for assessment   3. Utilize walk-in services with [facility] Monday-Friday starting at 12:30 (no appointment needed)     Gave call center number

## 2021-04-08 ENCOUNTER — TRANSFERRED RECORDS (OUTPATIENT)
Dept: HEALTH INFORMATION MANAGEMENT | Facility: CLINIC | Age: 61
End: 2021-04-08

## 2021-05-30 ENCOUNTER — HEALTH MAINTENANCE LETTER (OUTPATIENT)
Age: 61
End: 2021-05-30

## 2021-09-19 ENCOUNTER — HEALTH MAINTENANCE LETTER (OUTPATIENT)
Age: 61
End: 2021-09-19

## 2022-01-09 ENCOUNTER — HEALTH MAINTENANCE LETTER (OUTPATIENT)
Age: 62
End: 2022-01-09

## 2022-08-21 ENCOUNTER — HEALTH MAINTENANCE LETTER (OUTPATIENT)
Age: 62
End: 2022-08-21

## 2022-11-21 ENCOUNTER — HEALTH MAINTENANCE LETTER (OUTPATIENT)
Age: 62
End: 2022-11-21

## 2023-04-16 ENCOUNTER — HEALTH MAINTENANCE LETTER (OUTPATIENT)
Age: 63
End: 2023-04-16

## 2023-11-16 ENCOUNTER — TRANSCRIBE ORDERS (OUTPATIENT)
Dept: OTHER | Age: 63
End: 2023-11-16

## 2023-11-16 DIAGNOSIS — M25.512 CHRONIC LEFT SHOULDER PAIN: Primary | ICD-10-CM

## 2023-11-16 DIAGNOSIS — G89.29 CHRONIC LEFT SHOULDER PAIN: Primary | ICD-10-CM

## 2023-11-25 ENCOUNTER — HEALTH MAINTENANCE LETTER (OUTPATIENT)
Age: 63
End: 2023-11-25

## 2024-01-10 ENCOUNTER — THERAPY VISIT (OUTPATIENT)
Dept: PHYSICAL THERAPY | Facility: CLINIC | Age: 64
End: 2024-01-10
Attending: STUDENT IN AN ORGANIZED HEALTH CARE EDUCATION/TRAINING PROGRAM
Payer: COMMERCIAL

## 2024-01-10 DIAGNOSIS — M25.512 CHRONIC LEFT SHOULDER PAIN: ICD-10-CM

## 2024-01-10 DIAGNOSIS — G89.29 CHRONIC LEFT SHOULDER PAIN: ICD-10-CM

## 2024-01-10 PROCEDURE — 97110 THERAPEUTIC EXERCISES: CPT | Mod: GP

## 2024-01-10 PROCEDURE — 97161 PT EVAL LOW COMPLEX 20 MIN: CPT | Mod: GP

## 2024-01-10 NOTE — PROGRESS NOTES
PHYSICAL THERAPY EVALUATION  Type of Visit: Evaluation    See electronic medical record for Abuse and Falls Screening details.    Subjective       Presenting condition or subjective complaint: left arm pain - limited range  Date of onset: 11/16/23 (Referral)    Relevant medical history: Diabetes; Hearing problems; Implanted device; Overweight; Significant weakness; Sleep disorder like apnea; Stroke   Dates & types of surgery: Appendix removal 2016  Bicep area is where he feels it. Has been going on for a year, gradually getting better. Similar thing happened on right side. No inciting incident. Went to do something one day and realized he couldn't move his arm. Got worse initially, has been getting better over the last month. Arms behind back, horizontal abduction, abduction make things worse.    Prior diagnostic imaging/testing results: X-ray   X-ray 11/15/23: Overall, probable minor degeneration at the left inferior glenohumeral recess, without acute osseous abnormality.     Prior therapy history for the same diagnosis, illness or injury: No      Prior Level of Function  Transfers:   Ambulation:   ADL:   IADL:     Living Environment  Social support: Alone   Type of home: House; 1 level   Stairs to enter the home: Yes 2 Is there a railing: Yes   Ramp: No   Stairs inside the home: No       Help at home: None  Equipment owned: Straight Cane     Employment: Yes accounting  Hobbies/Interests: photography    Patient goals for therapy: move arm w/o pain    Pain assessment:      Objective   SHOULDER EVALUATION  PAIN: Pain Level at Rest: 0/10  Pain Level with Use: 9/10  Pain Location: shoulder  Pain Quality: severe ache  Pain Frequency: just with movement   Pain is Worst: all the same throughout the day  Pain is Exacerbated By: shoulder abduction, horizontal abduction, arm elevation in general, rolling over onto left side  Pain is Relieved By: none  Pain Progression: Improved  INTEGUMENTARY (edema, incisions):   POSTURE:  Sitting Posture: Rounded shoulders, Forward head, Thoracic kyphosis increased  GAIT:   Weightbearing Status:   Assistive Device(s):   Gait Deviations:   BALANCE/PROPRIOCEPTION:   WEIGHTBEARING ALIGNMENT:   ROM:   (Degrees) Left AROM Left PROM Right AROM  Right PROM   Shoulder Flexion 130 deg** ~145 deg, pain at end range WNL    Shoulder Extension 40 deg*  WNL    Shoulder Abduction 95 deg** 95 deg** WNL    Shoulder Adduction       Shoulder Internal Rotation Left glut** 20 deg** Slight limit in ROM    Shoulder External Rotation 50 deg* WNL WNL    Shoulder Horizontal Abduction       Shoulder Horizontal Adduction       Shoulder Flexion ER       Shoulder Flexion IR       Elbow Extension WNL - hypermobile  WNL - hypermobile    Elbow Flexion WNL  WNL    Pain:   End feel:     STRENGTH:   Pain: - none + mild ++ moderate +++ severe  Strength Scale: 0-5/5 Left Right   Shoulder Flexion 4+ 5   Shoulder Extension 5 5   Shoulder Abduction 4 5   Shoulder Adduction     Shoulder Internal Rotation 5 5   Shoulder External Rotation 5 5   Shoulder Horizontal Abduction     Shoulder Horizontal Adduction     Elbow Flexion 5 5   Elbow Extension 5 5   Mid Trap     Lower Trap     Rhomboid     Serratus Anterior     Most pain with resisted abduction and internal rotation, did have milder pain with flexion    FLEXIBILITY:   SPECIAL TESTS:  Belly press (+) on L, empty can (+) on left, O'krzysztof's test (-)  PALPATION:   JOINT MOBILITY:  L GH joint mobility all WNL, with no pain. Less firm endfeels with seeming hypermobility   CERVICAL SCREEN: WNL    Assessment & Plan   CLINICAL IMPRESSIONS  Medical Diagnosis: Chronic left shoulder pain    Treatment Diagnosis: Chronic left shoulder pain   Impression/Assessment: Patient is a 63 year old male with left shoulder complaints.  The following significant findings have been identified: Pain, Decreased ROM/flexibility, Decreased strength, Impaired muscle performance, Decreased activity tolerance, and Impaired  posture. These impairments interfere with their ability to perform self care tasks, recreational activities, and sleeping  as compared to previous level of function.     Clinical Decision Making (Complexity):  Clinical Presentation: Stable/Uncomplicated  Clinical Presentation Rationale: based on medical and personal factors listed in PT evaluation  Clinical Decision Making (Complexity): Low complexity    PLAN OF CARE  Treatment Interventions:  Modalities: Cryotherapy, E-stim, Hot Pack  Interventions: Manual Therapy, Neuromuscular Re-education, Therapeutic Activity, Therapeutic Exercise    Long Term Goals     PT Goal 1  Goal Identifier: LTG 1  Goal Description: Pt to reduce score on SPADI by at least 8 points indicating achievement of MCID  Rationale: to maximize safety and independence with performance of ADLs and functional tasks;to maximize safety and independence with self cares;to maximize safety and independence within the home  Target Date: 03/06/24      Frequency of Treatment: 1x per week  Duration of Treatment: 8 weeks    Recommended Referrals to Other Professionals:   Education Assessment:   Learner/Method: Patient;No Barriers to Learning    Risks and benefits of evaluation/treatment have been explained.   Patient/Family/caregiver agrees with Plan of Care.     Evaluation Time:     PT Eval, Low Complexity Minutes (66259): 25       Signing Clinician: Rip Davila PT

## 2024-01-26 ENCOUNTER — THERAPY VISIT (OUTPATIENT)
Dept: PHYSICAL THERAPY | Facility: CLINIC | Age: 64
End: 2024-01-26
Attending: STUDENT IN AN ORGANIZED HEALTH CARE EDUCATION/TRAINING PROGRAM
Payer: COMMERCIAL

## 2024-01-26 DIAGNOSIS — G89.29 CHRONIC LEFT SHOULDER PAIN: Primary | ICD-10-CM

## 2024-01-26 DIAGNOSIS — M25.512 CHRONIC LEFT SHOULDER PAIN: Primary | ICD-10-CM

## 2024-01-26 PROCEDURE — 97110 THERAPEUTIC EXERCISES: CPT | Mod: GP

## 2024-01-26 PROCEDURE — 97112 NEUROMUSCULAR REEDUCATION: CPT | Mod: GP

## 2024-02-02 ENCOUNTER — THERAPY VISIT (OUTPATIENT)
Dept: PHYSICAL THERAPY | Facility: CLINIC | Age: 64
End: 2024-02-02
Attending: STUDENT IN AN ORGANIZED HEALTH CARE EDUCATION/TRAINING PROGRAM
Payer: COMMERCIAL

## 2024-02-02 DIAGNOSIS — G89.29 CHRONIC LEFT SHOULDER PAIN: Primary | ICD-10-CM

## 2024-02-02 DIAGNOSIS — M25.512 CHRONIC LEFT SHOULDER PAIN: Primary | ICD-10-CM

## 2024-02-02 PROCEDURE — 97110 THERAPEUTIC EXERCISES: CPT | Mod: GP

## 2024-02-02 PROCEDURE — 97112 NEUROMUSCULAR REEDUCATION: CPT | Mod: GP

## 2024-06-22 ENCOUNTER — HEALTH MAINTENANCE LETTER (OUTPATIENT)
Age: 64
End: 2024-06-22

## 2025-01-04 ENCOUNTER — HEALTH MAINTENANCE LETTER (OUTPATIENT)
Age: 65
End: 2025-01-04

## 2025-07-12 ENCOUNTER — HEALTH MAINTENANCE LETTER (OUTPATIENT)
Age: 65
End: 2025-07-12